# Patient Record
Sex: FEMALE | Race: WHITE | HISPANIC OR LATINO | Employment: UNEMPLOYED | URBAN - METROPOLITAN AREA
[De-identification: names, ages, dates, MRNs, and addresses within clinical notes are randomized per-mention and may not be internally consistent; named-entity substitution may affect disease eponyms.]

---

## 2018-07-29 ENCOUNTER — HOSPITAL ENCOUNTER (EMERGENCY)
Facility: HOSPITAL | Age: 53
Discharge: HOME | End: 2018-07-29
Attending: EMERGENCY MEDICINE

## 2018-07-29 VITALS
HEIGHT: 64 IN | HEART RATE: 76 BPM | DIASTOLIC BLOOD PRESSURE: 70 MMHG | OXYGEN SATURATION: 99 % | TEMPERATURE: 98.5 F | BODY MASS INDEX: 25.61 KG/M2 | WEIGHT: 150 LBS | RESPIRATION RATE: 18 BRPM | SYSTOLIC BLOOD PRESSURE: 136 MMHG

## 2018-07-29 DIAGNOSIS — T16.2XXA FOREIGN BODY OF LEFT EAR, INITIAL ENCOUNTER: Primary | ICD-10-CM

## 2018-07-29 PROCEDURE — 99282 EMERGENCY DEPT VISIT SF MDM: CPT

## 2018-07-29 NOTE — DISCHARGE INSTRUCTIONS
Cuerpo extraño en el oído   LO QUE NECESITA SABER:   Un cuerpo extraño en el oído es un objeto que está atorado en [employer]  Generalmente, los cuerpos extraños se atoran en el conducto del oído externo  [place] es el tubo que va de la apertura del oído al tímpano  INSTRUCCIONES SOBRE [facility]:   Medicamentos:   · Crema esteroide:  [place] medicamento ayuda a disminuir el enrojecimiento y la inflamación  · Antibióticos:  [place] medicamento se administra para ayudar a tratar o prevenir naomi infección causada por bacteria  · [place] shae medicamentos roxana se le haya indicado  Consulte con love médico si usted nino que love medicamento no le está ayudando o si presenta efectos secundarios  Infórmele si es alérgico a algún medicamento  Mantenga naomi lista actualizada de los [place], las vitaminas y los productos herbales que mariah  Incluya los siguientes datos de los medicamentos: cantidad, frecuencia y motivo de administración  Traiga con usted la lista o los envases de la píldoras a shae citas de seguimiento  Lleve la lista de los medicamentos con usted en christian de naomi emergencia  Programe naomi didi con love médico u otorrinolaringólogo roxana se le indique:  Anote shae preguntas para que se acuerde de hacerlas maritza shae visitas  Comuníquese con love médico u otorrinolaringólogo si:   · Usted tiene fiebre  · Tiene dificultad para oir u oye zumbidos  · Usted tiene preguntas o inquietudes acerca de love condición o cuidado  Regrese a la adrian de emergencias si:   · Usted tiene dolor intenso en el oído  · Tiene pus o yulisa drenando del oído  © 2017 [address] Information is for End User's use only and may not be sold, redistributed or otherwise used for commercial purposes  All illustrations and images included in CareNotes® are the copyrighted property of A D A M , Inc  or Anatoly Mar  Esta información es sólo para uso en educación   Love intención no es darle un consejo [employer] Izabel enfermedades o tratamientos  Colsulte con griffin [place] farmacéutico antes de seguir cualquier régimen médico para saber si es seguro y efectivo para usted

## 2018-07-29 NOTE — ED PROVIDER NOTES
History  Chief Complaint   Patient presents with    Ear Problem     Pt states that she was cleaning her ears and the q-tip fell off in her ear 2 weeks ago  Pt is now having pain       History provided by:  Patient  Foreign Body in Ear   Location:  L ear  Suspected object: Q-tip head  Pain quality:  Aching and throbbing  Pain severity:  Mild  Duration:  2 weeks  Timing:  Constant  Progression:  Worsening  Chronicity:  New  Worsened by:  Nothing  Ineffective treatments:  None tried  Associated symptoms: ear pain    Associated symptoms: no abdominal pain, no congestion, no ear discharge, no hearing loss, no nasal discharge, no nausea, no nosebleeds, no sore throat, no trouble swallowing and no vomiting        None       History reviewed  No pertinent past medical history  History reviewed  No pertinent surgical history  History reviewed  No pertinent family history  I have reviewed and agree with the history as documented  Social History   Substance Use Topics    Smoking status: Never Smoker    Smokeless tobacco: Never Used    Alcohol use No        Review of Systems   Constitutional: Negative for activity change, chills, fatigue and fever  HENT: Positive for ear pain  Negative for congestion, ear discharge, hearing loss, mouth sores, nosebleeds, sore throat and trouble swallowing  Eyes: Negative for photophobia and visual disturbance  Cardiovascular: Negative for palpitations  Gastrointestinal: Negative for abdominal pain, constipation, diarrhea, nausea and vomiting  Genitourinary: Negative for decreased urine volume, difficulty urinating, dysuria, genital sores and hematuria  Musculoskeletal: Negative for arthralgias, myalgias, neck pain and neck stiffness  Skin: Negative for rash and wound  Neurological: Negative for dizziness, syncope, weakness, light-headedness, numbness and headaches  Hematological: Negative for adenopathy     All other systems reviewed and are negative  Physical Exam  Physical Exam   Constitutional: She is oriented to person, place, and time  She appears well-developed and well-nourished  No distress  HENT:   Head: Normocephalic and atraumatic  Right Ear: External ear normal    Nose: Nose normal    Mouth/Throat: Oropharynx is clear and moist    Cotton tip of Q-tip noted in left external ear canal  No pain with manipulation of external auricle  No mastoid tenderness of abnormality   Eyes: Conjunctivae and EOM are normal  Pupils are equal, round, and reactive to light  Neck: Normal range of motion  Neck supple  Cardiovascular: Normal rate, regular rhythm, normal heart sounds and intact distal pulses  Pulmonary/Chest: Effort normal and breath sounds normal    Abdominal: Soft  Bowel sounds are normal    Musculoskeletal: Normal range of motion  Neurological: She is alert and oriented to person, place, and time  Skin: Skin is warm and dry  Capillary refill takes less than 2 seconds  No rash noted  She is not diaphoretic  No erythema  No pallor         Vital Signs  ED Triage Vitals [07/29/18 0801]   Temperature Pulse Respirations Blood Pressure SpO2   98 5 °F (36 9 °C) 76 18 136/70 99 %      Temp src Heart Rate Source Patient Position - Orthostatic VS BP Location FiO2 (%)   -- -- -- -- --      Pain Score       --           Vitals:    07/29/18 0801   BP: 136/70   Pulse: 76       Visual Acuity      ED Medications  Medications - No data to display    Diagnostic Studies  Results Reviewed     None                 No orders to display              Procedures  Foreign Body - Orifice  Date/Time: 7/29/2018 8:15 AM  Performed by: Olga Napier  Authorized by: Olga Napier     Patient location:  ED  Other Assisting Provider: No    Consent:     Consent obtained:  Verbal    Consent given by:  Patient    Risks discussed:  Pain and TM perforation  Universal protocol:     Patient identity confirmed:  Verbally with patient  Location:     Location: Ear    Ear location:  L ear  Procedure details:     Localization method:  Direct visualization    Removal mechanism:  Alligator forceps    Foreign bodies recovered:  1    Description:  Wax cover cotton swab tip    Intact foreign body removal: yes    Post-procedure details:     Confirmation:  No additional foreign bodies on visualization    Patient tolerance of procedure: Tolerated well, no immediate complications           Phone Contacts  ED Phone Contact    ED Course                               MDM  Number of Diagnoses or Management Options  Foreign body of left ear, initial encounter: new and requires workup  Diagnosis management comments: Patient is a 62-year-old female presenting for evaluation of foreign body in ear  Patient has had cotton tip from Q-tip in her left external auditory canal for 2 weeks  She states that she has been having sharp pain for last couple days  Worse this morning  No fevers taken at home but patient reports fevers  Patient afebrile here  No discharge from left ear  Q-tip tip removed with alligator forceps  Inflammation noted of the external auditory ear canal however no signs of otitis externa at this time  TMs clear  PCP follow-up if pain persists for antibiotics for otitis externa    Risk of Complications, Morbidity, and/or Mortality  Presenting problems: low  Diagnostic procedures: minimal  Management options: moderate    Patient Progress  Patient progress: improved    CritCare Time    Disposition  Final diagnoses:   Foreign body of left ear, initial encounter     Time reflects when diagnosis was documented in both MDM as applicable and the Disposition within this note     Time User Action Codes Description Comment    7/29/2018  8:31 AM Pavithra Chen  2XXA] Foreign body of left ear, initial encounter       ED Disposition     ED Disposition Condition Comment    Discharge  Murlene Boas discharge to home/self care      Condition at discharge: Stable        Follow-up Information     Follow up With Specialties Details Why Contact Info Additional Information    Camila Rodríguez MD Internal Medicine Schedule an appointment as soon as possible for a visit As needed [address] Emergency Department Emergency Medicine  If symptoms worsen [address]  [phone]  ED, [address] [place] [address], [id number]          There are no discharge medications for this patient  No discharge procedures on file      ED Provider  Electronically Signed by           Dyane Nyhan, PA-C  07/29/18 3588

## 2019-06-26 ENCOUNTER — OFFICE VISIT (OUTPATIENT)
Dept: OBGYN CLINIC | Facility: CLINIC | Age: 54
End: 2019-06-26
Payer: COMMERCIAL

## 2019-06-26 VITALS — SYSTOLIC BLOOD PRESSURE: 138 MMHG | BODY MASS INDEX: 30.18 KG/M2 | WEIGHT: 175.8 LBS | DIASTOLIC BLOOD PRESSURE: 90 MMHG

## 2019-06-26 DIAGNOSIS — Z12.39 BREAST CANCER SCREENING: Primary | ICD-10-CM

## 2019-06-26 PROCEDURE — 87624 HPV HI-RISK TYP POOLED RSLT: CPT | Performed by: OBSTETRICS & GYNECOLOGY

## 2019-06-26 PROCEDURE — G0145 SCR C/V CYTO,THINLAYER,RESCR: HCPCS | Performed by: OBSTETRICS & GYNECOLOGY

## 2019-06-26 PROCEDURE — 99203 OFFICE O/P NEW LOW 30 MIN: CPT | Performed by: OBSTETRICS & GYNECOLOGY

## 2019-06-28 LAB
HPV HR 12 DNA CVX QL NAA+PROBE: NEGATIVE
HPV16 DNA CVX QL NAA+PROBE: NEGATIVE
HPV18 DNA CVX QL NAA+PROBE: NEGATIVE
LAB AP GYN PRIMARY INTERPRETATION: NORMAL
Lab: NORMAL

## 2019-07-01 ENCOUNTER — ULTRASOUND (OUTPATIENT)
Dept: OBGYN CLINIC | Facility: CLINIC | Age: 54
End: 2019-07-01
Payer: COMMERCIAL

## 2019-07-01 DIAGNOSIS — D21.9 FIBROID: Primary | ICD-10-CM

## 2019-07-01 PROCEDURE — 76856 US EXAM PELVIC COMPLETE: CPT | Performed by: OBSTETRICS & GYNECOLOGY

## 2019-07-01 NOTE — PROGRESS NOTES
AMB US Pelvic Non OB  Date/Time: 7/1/2019 11:42 AM  Performed by: Feroz Sullivan  Authorized by: Michelle Mosley MD     Procedure details:     Indications: leiomyoma and intermenstrual blood loss      Technique:  US Pelvic, Non-OB with complete exam  Uterine findings:     Length (cm): 88    Height (cm):  54    Width (cm):  44    Uterine adhesions: not identified      Adnexal mass: not identified      Polyps: not identified      Myomas: identified      Endometrial stripe: identified      Endometrial hyperplasia: not identified      Endometrium thickness (mm):  7  Left ovary findings:     Left ovary:  Visualized    Cysts: not identified      Length (cm): 26    Height (cm): 24    Width (cm): 13  Right ovary findings:     Right ovary:  Visualized    Cysts: not identified      Length (cm): 22    Height (cm): 19    Width (cm): 23  Other findings:     Free pelvic fluid: not identified      Free peritoneal fluid: not identified    Post-Procedure Details:     Impression:  Fibroids=Fundal=18 x 16 x 14 mm,ANT=18 x 18 x 17 mm, Post=22 x 22 x 23 mm    Tolerance:   Tolerated well, no immediate complications

## 2019-07-19 ENCOUNTER — PROCEDURE VISIT (OUTPATIENT)
Dept: OBGYN CLINIC | Facility: CLINIC | Age: 54
End: 2019-07-19
Payer: COMMERCIAL

## 2019-07-19 VITALS
WEIGHT: 176 LBS | SYSTOLIC BLOOD PRESSURE: 136 MMHG | HEIGHT: 65 IN | BODY MASS INDEX: 29.32 KG/M2 | DIASTOLIC BLOOD PRESSURE: 88 MMHG

## 2019-07-19 DIAGNOSIS — D21.9 FIBROIDS: Primary | ICD-10-CM

## 2019-07-19 DIAGNOSIS — N92.4 ABNORMAL PERIMENOPAUSAL BLEEDING: ICD-10-CM

## 2019-07-19 PROCEDURE — 58100 BIOPSY OF UTERUS LINING: CPT | Performed by: OBSTETRICS & GYNECOLOGY

## 2019-07-19 PROCEDURE — 88305 TISSUE EXAM BY PATHOLOGIST: CPT | Performed by: PATHOLOGY

## 2019-07-19 NOTE — PATIENT INSTRUCTIONS
Topic:  Perimenopausal bleeding pattern with uterine fibroids    Endometrial biopsy done without difficulty today and adequate tissue sample obtained    Further therapy planning will be based upon final pathology results    Instructions and restrictions discussed with patient and     Patient will call for any problems issues or concerns that arise for her

## 2019-07-19 NOTE — PROGRESS NOTES
Endometrial biopsy  Date/Time: 7/19/2019 1:22 PM  Performed by: Shamika Gillis MD  Authorized by: Shamika Gillis MD     Consent:     Consent obtained:  Verbal    Consent given by:  Patient    Procedural risks discussed:  Bleeding    Patient questions answered: yes      Patient agrees, verbalizes understanding, and wants to proceed: yes      Educational handouts given: no      Instructions and paperwork completed: yes    Universal protocol:     Patient states understanding of procedure being performed: yes      Relevant documents present and verified: yes      Test results available and properly labeled: yes      Imaging studies available: yes      Required blood products, implants, devices, and special equipment available: yes      Site marked: no    Pre-procedure:     Pre-procedure timeout performed: yes    Procedure:     Procedure: endometrial biopsy with [place]      A bivalve speculum was placed in the vagina: yes      Cervix cleaned and prepped: yes      A paracervical block was performed: no      An intracervical block was performed: no      The cervix was dilated: no      Uterus sounded: yes      Uterus sound depth (cm):  7    Specimen collected: specimen collected and sent to pathology      Patient tolerated procedure well with no complications: yes    Findings:     Uterus size:  6-8 weeks  Comments:      Topic:  Perimenopausal bleeding pattern with uterine fibroids    Endometrial biopsy performed without difficulty with adequate tissue sample obtained    Further treatment planning will be based upon final pathology results    All questions were answered for the patient    Instructions and restrictions reviewed with patient and     If patient will call for any problems issues or concerns that arise for her

## 2019-08-07 ENCOUNTER — TELEPHONE (OUTPATIENT)
Dept: OBGYN CLINIC | Facility: CLINIC | Age: 54
End: 2019-08-07

## 2019-08-07 NOTE — TELEPHONE ENCOUNTER
Pt spouse called looking for bx results  After speaking with Dr Raghu Nuñez pt had a benign polyp  Pt spouse is aware  Scheduled an appt for a yearly appt

## 2019-08-21 ENCOUNTER — TELEPHONE (OUTPATIENT)
Dept: OBGYN CLINIC | Facility: CLINIC | Age: 54
End: 2019-08-21

## 2019-08-21 NOTE — TELEPHONE ENCOUNTER
----- Message from Sheeba Jin MD sent at 8/18/2019 11:56 AM EDT -----  Endometrial biopsy was negative    Patient needs to be scheduled for annual examination    Thanks

## 2019-10-07 ENCOUNTER — OFFICE VISIT (OUTPATIENT)
Dept: FAMILY MEDICINE CLINIC | Facility: CLINIC | Age: 54
End: 2019-10-07
Payer: COMMERCIAL

## 2019-10-07 VITALS
OXYGEN SATURATION: 97 % | BODY MASS INDEX: 29.99 KG/M2 | SYSTOLIC BLOOD PRESSURE: 130 MMHG | WEIGHT: 180 LBS | HEART RATE: 68 BPM | TEMPERATURE: 98.1 F | HEIGHT: 65 IN | DIASTOLIC BLOOD PRESSURE: 80 MMHG | RESPIRATION RATE: 17 BRPM

## 2019-10-07 DIAGNOSIS — Z12.11 SCREENING FOR COLORECTAL CANCER: ICD-10-CM

## 2019-10-07 DIAGNOSIS — Z00.00 ANNUAL PHYSICAL EXAM: Primary | ICD-10-CM

## 2019-10-07 DIAGNOSIS — Z13.0 SCREENING FOR ENDOCRINE, NUTRITIONAL, METABOLIC AND IMMUNITY DISORDER: ICD-10-CM

## 2019-10-07 DIAGNOSIS — Z13.29 SCREENING FOR ENDOCRINE, NUTRITIONAL, METABOLIC AND IMMUNITY DISORDER: ICD-10-CM

## 2019-10-07 DIAGNOSIS — Z28.21 REFUSED INFLUENZA VACCINE: ICD-10-CM

## 2019-10-07 DIAGNOSIS — Z12.12 SCREENING FOR COLORECTAL CANCER: ICD-10-CM

## 2019-10-07 DIAGNOSIS — J34.9 SINUS PROBLEM: ICD-10-CM

## 2019-10-07 DIAGNOSIS — Z13.228 SCREENING FOR ENDOCRINE, NUTRITIONAL, METABOLIC AND IMMUNITY DISORDER: ICD-10-CM

## 2019-10-07 DIAGNOSIS — Z13.21 SCREENING FOR ENDOCRINE, NUTRITIONAL, METABOLIC AND IMMUNITY DISORDER: ICD-10-CM

## 2019-10-07 PROCEDURE — 99386 PREV VISIT NEW AGE 40-64: CPT | Performed by: FAMILY MEDICINE

## 2019-10-07 NOTE — PROGRESS NOTES
[facility]    NAME: Desiree Borrego  AGE: 48 y o  SEX: female  : 1965     DATE: 10/7/2019     Assessment and Plan:     Problem List Items Addressed This Visit     None      Visit Diagnoses     Annual physical exam    -  Primary    Screening for endocrine, nutritional, metabolic and immunity disorder        Relevant Orders    Lipid panel    Comprehensive metabolic panel    CBC and Platelet    TSH, 3rd generation with Free T4 reflex    Vitamin D 25 hydroxy    Screening for colorectal cancer        Relevant Orders    Occult Blood, Fecal Immunochemical (FIT)    BMI 29 0-29 9,adult        Sinus problem        Relevant Orders    Ambulatory Referral to Otolaryngology    Refused influenza vaccine              Immunizations and preventive care screenings were discussed with patient today  Appropriate education was printed on patient's after visit summary  Counseling:  · Exercise: the importance of regular exercise/physical activity was discussed  Recommend exercise 3-5 times per week for at least 30 minutes  BMI Counseling: Body mass index is 29 95 kg/m²  The BMI is above normal  Nutrition recommendations include encouraging healthy choices of fruits and vegetables and decreasing fast food intake  Exercise recommendations include exercising 3-5 times per week  No pharmacotherapy was ordered  Return in 1 year (on 10/7/2020) for Annual physical      Chief Complaint:     No chief complaint on file  History of Present Illness:     Adult Annual Physical   Patient here for a comprehensive physical exam  The patient reports problems - patient report that 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 due to breathing pollution in [place] for several years; this was diagnosed in [facility]     Requests referral to ENT for further evaluation and management      Diet and Physical Activity  · Diet/Nutrition: frequent junk food and limited fruits/vegetables  · Exercise: no formal exercise  Depression Screening  PHQ-9 Depression Screening    PHQ-9:    Frequency of the following problems over the past two weeks:       Little interest or pleasure in doing things:  0 - not at all  Feeling down, depressed, or hopeless:  1 - several days  PHQ-2 Score:  1       General Health  · Sleep: sleeps poorly and gets 4-6 hours of sleep on average  No snoring  · Hearing: normal - bilateral   · Vision: no vision problems  · Dental: no dental visits for >1 year and brushes teeth once daily  /GYN Health   · Patient is: perimenopausal  · Last menstrual period: 9/28/19  · Contraceptive method: not addressed  Review of Systems:     Review of Systems   Constitutional: Negative  HENT:        History of sinus problems diagnosed in [facility]  Eyes: Negative  Respiratory: Negative  Cardiovascular: Negative  Gastrointestinal: Negative  Endocrine: Negative  Genitourinary: Negative  Musculoskeletal: Negative  Skin: Negative  Allergic/Immunologic: Negative  Neurological: Negative  Hematological: Negative  Psychiatric/Behavioral: Negative  Past Medical History:     History reviewed  No pertinent past medical history  Past Surgical History:     History reviewed  No pertinent surgical history     Social History:     Social History     Socioeconomic History    Marital status: /Civil Union     Spouse name: None    Number of children: None    Years of education: None    Highest education level: None   Occupational History    Occupation: unemployed   Social Needs    Financial resource strain: Not hard at all   Jhon-Aneesh insecurity:     Worry: Never true     Inability: Never true   [employer] needs:     Medical: No     Non-medical: No   Tobacco Use    Smoking status: Never Smoker    Smokeless tobacco: Never Used   Substance and Sexual Activity    Alcohol use: No    Drug use: No    Sexual activity: Yes   Lifestyle    Physical activity:     Days per week: 0 days     Minutes per session: 0 min    Stress: Only a little   Relationships    Social connections:     Talks on phone: Patient refused     Gets together: Patient refused     Attends Holiness service: Patient refused     Active member of club or organization: Patient refused     Attends meetings of clubs or organizations: Patient refused     Relationship status: Patient refused    Intimate partner violence:     Fear of current or ex partner: Patient refused     Emotionally abused: Patient refused     Physically abused: Patient refused     Forced sexual activity: Patient refused   Other Topics Concern    None   Social History Narrative    None      Family History:     Family History   Problem Relation Age of Onset    Hypertension Mother       Current Medications:     No current outpatient medications on file  No current facility-administered medications for this visit  Allergies: Allergies   Allergen Reactions    Anesthetic [Benzocaine]       Physical Exam:     /80 (BP Location: Left arm, Patient Position: Sitting, Cuff Size: Large)   Pulse 68   Temp 98 1 °F (36 7 °C) (Oral)   Resp 17   Ht 5' 5" (1 651 m)   Wt 81 6 kg (180 lb)   SpO2 97%   BMI 29 95 kg/m²     Physical Exam   Constitutional: She is oriented to person, place, and time  She appears well-developed and well-nourished  She is cooperative  HENT:   Head: Normocephalic and atraumatic  Right Ear: Hearing, tympanic membrane, external ear and ear canal normal    Left Ear: Hearing, tympanic membrane, external ear and ear canal normal    Mouth/Throat: Uvula is midline, oropharynx is clear and moist and mucous membranes are normal    Eyes: Pupils are equal, round, and reactive to light  Conjunctivae and lids are normal    Neck: Trachea normal and normal range of motion  Neck supple  No thyromegaly present     Cardiovascular: Normal rate, regular rhythm and normal heart sounds  No murmur heard  Pulses:       Posterior tibial pulses are 2+ on the right side, and 2+ on the left side  Pulmonary/Chest: Effort normal and breath sounds normal  She has no decreased breath sounds  She has no wheezes  She has no rhonchi  She has no rales  Abdominal: Soft  Normal appearance and bowel sounds are normal  There is no hepatosplenomegaly  There is no tenderness  Musculoskeletal: Normal range of motion  Right ankle: She exhibits no swelling  Left ankle: She exhibits no swelling  Lymphadenopathy:        Head (right side): No submandibular adenopathy present  Head (left side): No submandibular adenopathy present  She has no cervical adenopathy  Neurological: She is alert and oriented to person, place, and time  No cranial nerve deficit  She exhibits normal muscle tone  Gait normal    Skin: Skin is warm, dry and intact  Psychiatric: She has a normal mood and affect  Her speech is normal and behavior is normal    Nursing note and vitals reviewed        Whitney Trent MD  [address] [place] [address]

## 2019-10-07 NOTE — PATIENT INSTRUCTIONS
Wellness Visit for Adults   AMBULATORY CARE:   A wellness visit  is when you see your healthcare provider to get screened for health problems  You can also get advice on how to stay healthy  Write down your questions so you remember to ask them  Ask your healthcare provider how often you should have a wellness visit  What happens at a wellness visit:  Your healthcare provider will ask about your health, and your family history of health problems  This includes high blood pressure, heart disease, and cancer  He or she will ask if you have symptoms that concern you, if you smoke, and about your mood  You may also be asked about your intake of medicines, supplements, food, and alcohol  Any of the following may be done:  · Your weight  will be checked  Your height may also be checked so your body mass index (BMI) can be calculated  Your BMI shows if you are at a healthy weight  · Your blood pressure  and heart rate will be checked  Your temperature may also be checked  · Blood and urine tests  may be done  Blood tests may be done to check your cholesterol levels  Abnormal cholesterol levels increase your risk for heart disease and stroke  You may also need a blood or urine test to check for diabetes if you are at increased risk  Urine tests may be done to look for signs of an infection or kidney disease  · A physical exam  includes checking your heartbeat and lungs with a stethoscope  Your healthcare provider may also check your skin to look for sun damage  · Screening tests  may be recommended  A screening test is done to check for diseases that may not cause symptoms  The screening tests you may need depend on your age, gender, family history, and lifestyle habits  For example, colorectal screening may be recommended if you are 48years old or older  Screening tests you need if you are a woman:   · A Pap smear  is used to screen for cervical cancer   Pap smears are usually done every 3 to 5 years depending on your age  You may need them more often if you have had abnormal Pap smear test results in the past  Ask your healthcare provider how often you should have a Pap smear  · A mammogram  is an x-ray of your breasts to screen for breast cancer  Experts recommend mammograms every 2 years starting at age 48 years  You may need a mammogram at age 52 years or younger if you have an increased risk for breast cancer  Talk to your healthcare provider about when you should start having mammograms and how often you need them  Vaccines you may need:   · Get an influenza vaccine  every year  The influenza vaccine protects you from the flu  Several types of viruses cause the flu  The viruses change over time, so new vaccines are made each year  · Get a tetanus-diphtheria (Td) booster vaccine  every 10 years  This vaccine protects you against tetanus and diphtheria  Tetanus is a severe infection that may cause painful muscle spasms and lockjaw  Diphtheria is a severe bacterial infection that causes a thick covering in the back of your mouth and throat  · Get a human papillomavirus (HPV) vaccine  if you are female and aged 23 to 32 or male 23 to 24 and never received it  This vaccine protects you from HPV infection  HPV is the most common infection spread by sexual contact  HPV may also cause vaginal, penile, and anal cancers  · Get a pneumococcal vaccine  if you are aged 72 years or older  The pneumococcal vaccine is an injection given to protect you from pneumococcal disease  Pneumococcal disease is an infection caused by pneumococcal bacteria  The infection may cause pneumonia, meningitis, or an ear infection  · Get a shingles vaccine  if you are aged 61 or older, even if you have had shingles before  The shingles vaccine is an injection to protect you from the varicella-zoster virus  This is the same virus that causes chickenpox   Shingles is a painful rash that develops in people who had chickenpox or have been exposed to the virus  How to eat healthy:  My Plate is a model for planning healthy meals  It shows the types and amounts of foods that should go on your plate  Fruits and vegetables make up about half of your plate, and grains and protein make up the other half  A serving of dairy is included on the side of your plate  The amount of calories and serving sizes you need depends on your age, gender, weight, and height  Examples of healthy foods are listed below:  · Eat a variety of vegetables  such as dark green, red, and orange vegetables  You can also include canned vegetables low in sodium (salt) and frozen vegetables without added butter or sauces  · Eat a variety of fresh fruits , canned fruit in 100% juice, frozen fruit, and dried fruit  · Include whole grains  At least half of the grains you eat should be whole grains  Examples include whole-wheat bread, wheat pasta, brown rice, and whole-grain cereals such as oatmeal     · Eat a variety of protein foods such as seafood (fish and shellfish), lean meat, and poultry without skin (turkey and chicken)  Examples of lean meats include pork leg, shoulder, or tenderloin, and beef round, sirloin, tenderloin, and extra lean ground beef  Other protein foods include eggs and egg substitutes, beans, peas, soy products, nuts, and seeds  · Choose low-fat dairy products such as skim or 1% milk or low-fat yogurt, cheese, and cottage cheese  · Limit unhealthy fats  such as butter, hard margarine, and shortening  Exercise:  Exercise at least 30 minutes per day on most days of the week  Some examples of exercise include walking, biking, dancing, and swimming  You can also fit in more physical activity by taking the stairs instead of the elevator or parking farther away from stores  Include muscle strengthening activities 2 days each week  Regular exercise provides many health benefits   It helps you manage your weight, and decreases your risk for type 2 diabetes, heart disease, stroke, and high blood pressure  Exercise can also help improve your mood  Ask your healthcare provider about the best exercise plan for you  General health and safety guidelines:   · Do not smoke  Nicotine and other chemicals in cigarettes and cigars can cause lung damage  Ask your healthcare provider for information if you currently smoke and need help to quit  E-cigarettes or smokeless tobacco still contain nicotine  Talk to your healthcare provider before you use these products  · Limit alcohol  A drink of alcohol is 12 ounces of beer, 5 ounces of wine, or 1½ ounces of liquor  · Lose weight, if needed  Being overweight increases your risk of certain health conditions  These include heart disease, high blood pressure, type 2 diabetes, and certain types of cancer  · Protect your skin  Do not sunbathe or use tanning beds  Use sunscreen with a SPF 15 or higher  Apply sunscreen at least 15 minutes before you go outside  Reapply sunscreen every 2 hours  Wear protective clothing, hats, and sunglasses when you are outside  · Drive safely  Always wear your seatbelt  Make sure everyone in your car wears a seatbelt  A seatbelt can save your life if you are in an accident  Do not use your cell phone when you are driving  This could distract you and cause an accident  Pull over if you need to make a call or send a text message  · Practice safe sex  Use latex condoms if are sexually active and have more than one partner  Your healthcare provider may recommend screening tests for sexually transmitted infections (STIs)  · Wear helmets, lifejackets, and protective gear  Always wear a helmet when you ride a bike or motorcycle, go skiing, or play sports that could cause a head injury  Wear protective equipment when you play sports  Wear a lifejacket when you are on a boat or doing water sports    © 2017 [employer] Information is for End User's use only and may not be sold, redistributed or otherwise used for commercial purposes  All illustrations and images included in CareNotes® are the copyrighted property of A D A M , Inc  or Anatoly Mar  The above information is an  only  It is not intended as medical advice for individual conditions or treatments  Talk to your doctor, nurse or pharmacist before following any medical regimen to see if it is safe and effective for you  Weight Management   AMBULATORY CARE:   Why it is important to manage your weight:  Being overweight increases your risk of health conditions such as heart disease, high blood pressure, type 2 diabetes, and certain types of cancer  It can also increase your risk for osteoarthritis, sleep apnea, and other respiratory problems  Aim for a slow, steady weight loss  Even a small amount of weight loss can lower your risk of health problems  How to lose weight safely:  A safe and healthy way to lose weight is to eat fewer calories and get regular exercise  You can lose up about 1 pound a week by decreasing the number of calories you eat by 500 calories each day  You can decrease calories by eating smaller portion sizes or by cutting out high-calorie foods  Read labels to find out how many calories are in the foods you eat  You can also burn calories with exercise such as walking, swimming, or biking  You will be more likely to keep weight off if you make these changes part of your lifestyle  Healthy meal plan for weight management:  A healthy meal plan includes a variety of foods, contains fewer calories, and helps you stay healthy  A healthy meal plan includes the following:  · Eat whole-grain foods more often  A healthy meal plan should contain fiber  Fiber is the part of grains, fruits, and vegetables that is not broken down by your body  Whole-grain foods are healthy and provide extra fiber in your diet   Some examples of whole-grain foods are whole-wheat breads and pastas, oatmeal, brown rice, and bulgur  · Eat a variety of vegetables every day  Include dark, leafy greens such as spinach, kale, martina greens, and mustard greens  Eat yellow and orange vegetables such as carrots, sweet potatoes, and winter squash  · Eat a variety of fruits every day  Choose fresh or canned fruit (canned in its own juice or light syrup) instead of juice  Fruit juice has very little or no fiber  · Eat low-fat dairy foods  Drink fat-free (skim) milk or 1% milk  Eat fat-free yogurt and low-fat cottage cheese  Try low-fat cheeses such as mozzarella and other reduced-fat cheeses  · Choose meat and other protein foods that are low in fat  Choose beans or other legumes such as split peas or lentils  Choose fish, skinless poultry (chicken or turkey), or lean cuts of red meat (beef or pork)  Before you cook meat or poultry, cut off any visible fat  · Use less fat and oil  Try baking foods instead of frying them  Add less fat, such as margarine, sour cream, regular salad dressing and mayonnaise to foods  Eat fewer high-fat foods  Some examples of high-fat foods include french fries, doughnuts, ice cream, and cakes  · Eat fewer sweets  Limit foods and drinks that are high in sugar  This includes candy, cookies, regular soda, and sweetened drinks  Ways to decrease calories:   · Eat smaller portions  ¨ Use a small plate with smaller servings  ¨ Do not eat second helpings  ¨ When you eat at a restaurant, ask for a box and place half of your meal in the box before you eat  ¨ Share an entrée with someone else  · Replace high-calorie snacks with healthy, low-calorie snacks  ¨ Choose fresh fruit, vegetables, fat-free rice cakes, or air-popped popcorn instead of potato chips, nuts, or chocolate  ¨ Choose water or calorie-free drinks instead of soda or sweetened drinks  · Eat regular meals  Skipping meals can lead to overeating later in the day   Eat a healthy snack in place of a meal if you do not have time to eat a regular meal      · Do not shop for groceries when you are hungry  You may be more likely to make unhealthy food choices  Take a grocery list of healthy foods and shop after you have eaten  Exercise:  Exercise at least 30 minutes per day on most days of the week  Some examples of exercise include walking, biking, dancing, and swimming  You can also fit in more physical activity by taking the stairs instead of the elevator or parking farther away from stores  Ask your healthcare provider about the best exercise plan for you  Other things to consider as you try to lose weight:   · Be aware of situations that may give you the urge to overeat, such as eating while watching television  Find ways to avoid these situations  For example, read a book, go for a walk, or do crafts  · Meet with a weight loss support group or friends who are also trying to lose weight  This may help you stay motivated to continue working on your weight loss goals  © 2017 [employer] Information is for End User's use only and may not be sold, redistributed or otherwise used for commercial purposes  All illustrations and images included in CareNotes® are the copyrighted property of [employer]  or Anatoly Mar  The above information is an  only  It is not intended as medical advice for individual conditions or treatments  Talk to your doctor, nurse or pharmacist before following any medical regimen to see if it is safe and effective for you

## 2019-11-21 ENCOUNTER — APPOINTMENT (OUTPATIENT)
Dept: LAB | Facility: HOSPITAL | Age: 54
End: 2019-11-21
Payer: COMMERCIAL

## 2019-11-21 DIAGNOSIS — Z13.29 SCREENING FOR ENDOCRINE, NUTRITIONAL, METABOLIC AND IMMUNITY DISORDER: ICD-10-CM

## 2019-11-21 DIAGNOSIS — Z13.0 SCREENING FOR ENDOCRINE, NUTRITIONAL, METABOLIC AND IMMUNITY DISORDER: ICD-10-CM

## 2019-11-21 DIAGNOSIS — Z13.21 SCREENING FOR ENDOCRINE, NUTRITIONAL, METABOLIC AND IMMUNITY DISORDER: ICD-10-CM

## 2019-11-21 DIAGNOSIS — Z13.228 SCREENING FOR ENDOCRINE, NUTRITIONAL, METABOLIC AND IMMUNITY DISORDER: ICD-10-CM

## 2019-11-21 LAB
25(OH)D3 SERPL-MCNC: 14.4 NG/ML (ref 30–100)
ALBUMIN SERPL BCP-MCNC: 3.7 G/DL (ref 3.5–5)
ALP SERPL-CCNC: 98 U/L (ref 46–116)
ALT SERPL W P-5'-P-CCNC: 19 U/L (ref 12–78)
ANION GAP SERPL CALCULATED.3IONS-SCNC: 6 MMOL/L (ref 4–13)
AST SERPL W P-5'-P-CCNC: 11 U/L (ref 5–45)
BILIRUB SERPL-MCNC: 0.66 MG/DL (ref 0.2–1)
BUN SERPL-MCNC: 10 MG/DL (ref 5–25)
CALCIUM SERPL-MCNC: 8.8 MG/DL (ref 8.3–10.1)
CHLORIDE SERPL-SCNC: 109 MMOL/L (ref 100–108)
CHOLEST SERPL-MCNC: 235 MG/DL (ref 50–200)
CO2 SERPL-SCNC: 27 MMOL/L (ref 21–32)
CREAT SERPL-MCNC: 0.77 MG/DL (ref 0.6–1.3)
ERYTHROCYTE [DISTWIDTH] IN BLOOD BY AUTOMATED COUNT: 13.4 % (ref 11.6–15.1)
GFR SERPL CREATININE-BSD FRML MDRD: 89 ML/MIN/1.73SQ M
GLUCOSE P FAST SERPL-MCNC: 93 MG/DL (ref 65–99)
HCT VFR BLD AUTO: 40.3 % (ref 34.8–46.1)
HDLC SERPL-MCNC: 55 MG/DL
HGB BLD-MCNC: 13.2 G/DL (ref 11.5–15.4)
LDLC SERPL CALC-MCNC: 157 MG/DL (ref 0–100)
MCH RBC QN AUTO: 27.8 PG (ref 26.8–34.3)
MCHC RBC AUTO-ENTMCNC: 32.8 G/DL (ref 31.4–37.4)
MCV RBC AUTO: 85 FL (ref 82–98)
NONHDLC SERPL-MCNC: 180 MG/DL
PLATELET # BLD AUTO: 259 THOUSANDS/UL (ref 149–390)
PMV BLD AUTO: 10.6 FL (ref 8.9–12.7)
POTASSIUM SERPL-SCNC: 3.4 MMOL/L (ref 3.5–5.3)
PROT SERPL-MCNC: 7.6 G/DL (ref 6.4–8.2)
RBC # BLD AUTO: 4.74 MILLION/UL (ref 3.81–5.12)
SODIUM SERPL-SCNC: 142 MMOL/L (ref 136–145)
TRIGL SERPL-MCNC: 114 MG/DL
TSH SERPL DL<=0.05 MIU/L-ACNC: 1.89 UIU/ML (ref 0.36–3.74)
WBC # BLD AUTO: 8.16 THOUSAND/UL (ref 4.31–10.16)

## 2019-11-21 PROCEDURE — 85027 COMPLETE CBC AUTOMATED: CPT

## 2019-11-21 PROCEDURE — 80053 COMPREHEN METABOLIC PANEL: CPT

## 2019-11-21 PROCEDURE — 84443 ASSAY THYROID STIM HORMONE: CPT

## 2019-11-21 PROCEDURE — 82306 VITAMIN D 25 HYDROXY: CPT

## 2019-11-21 PROCEDURE — 36415 COLL VENOUS BLD VENIPUNCTURE: CPT

## 2019-11-21 PROCEDURE — 80061 LIPID PANEL: CPT

## 2019-11-22 ENCOUNTER — TELEPHONE (OUTPATIENT)
Dept: FAMILY MEDICINE CLINIC | Facility: CLINIC | Age: 54
End: 2019-11-22

## 2019-11-22 NOTE — TELEPHONE ENCOUNTER
Recommend patient schedule a follow-up visit to discuss her lab results  Patient may access [employer] to see the results any time she wishes to do so  Please also remind patient that she needs to complete the FIT kit for colon cancer screening

## 2019-12-16 ENCOUNTER — OFFICE VISIT (OUTPATIENT)
Dept: FAMILY MEDICINE CLINIC | Facility: CLINIC | Age: 54
End: 2019-12-16
Payer: COMMERCIAL

## 2019-12-16 VITALS
WEIGHT: 182 LBS | DIASTOLIC BLOOD PRESSURE: 78 MMHG | HEART RATE: 78 BPM | BODY MASS INDEX: 30.32 KG/M2 | TEMPERATURE: 97.8 F | SYSTOLIC BLOOD PRESSURE: 128 MMHG | RESPIRATION RATE: 16 BRPM | HEIGHT: 65 IN | OXYGEN SATURATION: 98 %

## 2019-12-16 DIAGNOSIS — E87.6 HYPOKALEMIA: ICD-10-CM

## 2019-12-16 DIAGNOSIS — E78.5 HYPERLIPIDEMIA LDL GOAL <100: ICD-10-CM

## 2019-12-16 DIAGNOSIS — E55.9 VITAMIN D DEFICIENCY: Primary | ICD-10-CM

## 2019-12-16 PROCEDURE — 99214 OFFICE O/P EST MOD 30 MIN: CPT | Performed by: FAMILY MEDICINE

## 2019-12-16 PROCEDURE — 1036F TOBACCO NON-USER: CPT | Performed by: FAMILY MEDICINE

## 2019-12-16 PROCEDURE — 3008F BODY MASS INDEX DOCD: CPT | Performed by: FAMILY MEDICINE

## 2019-12-16 RX ORDER — ERGOCALCIFEROL 1.25 MG/1
50000 CAPSULE ORAL WEEKLY
Qty: 12 CAPSULE | Refills: 0 | Status: SHIPPED | OUTPATIENT
Start: 2019-12-16 | End: 2020-11-20

## 2019-12-16 RX ORDER — ROSUVASTATIN CALCIUM 10 MG/1
10 TABLET, COATED ORAL DAILY
Qty: 90 TABLET | Refills: 1 | Status: SHIPPED | OUTPATIENT
Start: 2019-12-16

## 2019-12-16 NOTE — ASSESSMENT & PLAN NOTE
Patient has a slightly low potassium of 3 4  Recommend increasing the potassium in her diet, and a handout was provided  Will reassess her potassium level with next labs in 3 months

## 2019-12-16 NOTE — PATIENT INSTRUCTIONS
Lista de alimentos con contenido de potasio   LO QUE NECESITA SABER:   ¿Qué es el potasio? El potasio es un mineral que se encuentra en la mayoría de los alimentos  El potasio ayuda a balancear los líquidos y minerales en griffin cuerpo  También ayuda al cuerpo a mantener la presión sanguínea a un nivel normal  El potasio facilita las contracciones musculares y la función [place] de los nervios  Es posible que deba aumentar o disminuir el consumo de potasio si sufre ciertas afecciones médicas  ¿Por qué necesito cambiar la cantidad de potasio que consumo? · Es posible que deba consumir más potasio  si usted tiene hipocalemia (niveles bajos de potasio) o presión arterial fady  También podría necesitar más potasio en griffin dieta si usted mariah diuréticos  Los diuréticos y ciertos medicamentos causan que griffin cuerpo pierda potasio  · Es posible que deba consumir menos potasio  en griffin dieta si usted tiene hipercalemia (niveles altos de potasio) o naomi enfermedad renal   ¿Cuánto potasio contienen las frutas? La cantidad de potasio en miligramos (mg) en cada fruta o porción de fruta, aparece en la lista junto a cada elemento    · Alimentos con alto contenido de potasio (más de 200 mg por porción):      ¨ 1 plátano mediano (425)    ¨ ½ papaya (390)    ¨ ½ taza de jugo de ciruela pasa (370)    ¨ ¼ de naomi taza de pasas (270)    ¨ 1 tasneem mediano (325) o kiwi (240)    ¨ 1 naranja pequeña (240) o ½ taza de jugo de naranja (235)    ¨ ½ taza de melón kam (215) o melón roma kam (200)    ¨ 1 nehemias mediana (200)    · Alimentos con un contenido medio de potasio (50 a 200 mg por porción):      ¨ 1 durazno mediano (185)    ¨ 1 manzana pequeña o ½ taza de jugo de [place] (150)    ¨ ½ taza de duraznos enlatados en jugo (120)    ¨ ½ taza de padilla enlatada (100)    ¨ ½ taza de fresas frescas y rebanadas (125)    ¨ ½ taza de sandía (85)    · Alimentos con bajo contenido de potasio (menos de 50 mg por porción):      ¨ ½ taza de arándanos (45) o cóctel de jugo de arándano (20)    ¨ ½ taza de néctar de papaya, tasneem o nehemias (35)  ¿Cuanta de potasio Ankit? · Alimentos con alto contenido de potasio (más de 200 mg por porción):      ¨ 1 papa mediana horneada con cascara (925)    ¨ 1 camote mediano horneado con cascara (450)    ¨ ½ taza de tomate o jugo de vegetales (275), o 1 tomate crudo de tamaño mediano (290)    ¨ ½ taza de champiñones (280)    ¨ ½ taza de coles de Bruselas frescas (250)    ¨ ½ taza de calabacín cocida (220) o calabaza de invierno (250)    ¨ ¼ de un aguacate mediano (245)    ¨ ½ taza de brócoli (230)    · Alimentos con un contenido medio de potasio (50 a 200 mg por porción):      ¨ ½ taza de maíz (195)    ¨ ½ taza de zanahorias frescas o cocidas (180)    ¨ ½ taza de coliflor fresco (150)    ¨ ½ taza de espárragos (155)    ¨ ½ taza de guisantes enlatados (90)     ¨ 1 taza de ruth, de todo tipo (100)    ¨ ½ taza de habas verdes frescas (90)    ¨ ½ taza de habas verdes congeladas (85)    ¨ ½ taza de pepino (80)  ¿Qué cantidad de potasio contienen los alimentos proteicos? · Alimentos con alto contenido de potasio (más de 200 mg por porción):      ¨ ½ taza de frijoles pintos cocidos (400) o lentejas (365)    ¨ 1 taza de leche de soya (300)    ¨ 3 onzas de salmón horneado o asado (319)    ¨ 3 onzas de pavo asado, carne oscura (250)    ¨ ¼ de naomi taza de semillas de girasol (241)    ¨ 3 onzas de carne magra de res cocida (224)    ¨ 2 cucharadas de mantequilla de maní suave (210)    · Alimentos con un contenido medio de potasio (50 a 200 mg por porción):      ¨ 1 onza de maní, almendras o anacardos salados (200)    ¨ 1 huevo hussain (60)  ¿Qué cantidad de potasio contienen los alimentos lácteos?    · Alimentos con alto contenido de potasio (más de 200 mg por porción):      ¨ 6 onzas de yogur (260 a 435)    ¨ 1 taza de leche entera o descremada y baja en grasas (350 a 200)    · Alimentos con un contenido medio de [employer] (50 a 200 mg por porción):      ¨ ½ taza de queso ricotta (154)    ¨ ½ taza de helado de vainilla (131)    ¨ ½ taza de requesón (2%) bajo en grasa (110)    · Alimentos con bajo contenido de potasio (menos de 50 mg por porción):      ¨ 1 onza de queso (20 a 30)    ¿Qué cantidad de potasio contienen los granos? · 1 rebanada de pan gibbs (30)    · ½ taza de arroz gibbs o integral (50)    · ½ taza de espagueti o macarrones (30)    · 1 tortilla de harina o maíz (50)    · 1 wafle de cuatro pulgadas (50)  ¿Cuáles otros alimentos contienen potasio? · 1 cucharada de melazas (295)    · 1½ onzas de chocolate (165)    · Algunos sustitutos de la sal pueden contener naomi fady cantidad de potasio  Revise la etiqueta de los alimentos para encontrar la cantidad de potasio que contienen  ACUERDOS SOBRE FELDER CUIDADO:   Usted tiene el derecho de ayudar a planear felder cuidado  Discuta shae opciones de tratamiento con shae médicos para decidir el cuidado que usted desea recibir  Usted siempre tiene el derecho de rechazar el tratamiento  Esta información es sólo para uso en educación  Felder intención no es darle un consejo médico sobre enfermedades o tratamientos  Colsulte con felder Armando Hu farmacéutico antes de seguir cualquier régimen médico para saber si es seguro y efectivo para usted  © 2017 [address] [employer] Information is for End User's use only and may not be sold, redistributed or otherwise used for commercial purposes  All illustrations and images included in CareNotes® are the copyrighted property of A D A M , Inc  or Anatoly Cedillo con alto contenido de [employer]   LO QUE NECESITA SABER:   ¿Qué es naomi dieta con alto contenido de Twisp? Naomi dieta con alto contenido de fibra incluye alimentos con naomi fady cantidad de fibra  La fibra es la parte de las frutas, los vegetales y los granos, que no se descompone al ser ingerida por felder cuerpo  La fibra ayuda a regular las evacuaciones intestinales  La fibra además ayuda a bajar el colesterol, controlar la glucosa en la yulisa en las personas que sufren de diabetes y para aliviar el estreñimiento  También la fibra podría ayudarle a controlar griffin peso debido a que le da la sensación de llenura más rápidamente  La mayoría de los adultos deberían consumir entre 25 a 35 gramos de fibra al día  Consulte con griffin dietista o médico sobre la cantidad de fibra que usted necesita  ¿Cuáles alimentos son Maryann Board? · Alimentos que contienen al menos 4 gramos de fibra por porción:      ¨ ? a ½ de naomi taza de cereal con alto contenido de fibra (jack la etiqueta nutricional en la caja)    ¨ ½ taza de moras o frambuesas    ¨ 4 ciruelas pasas    ¨ 1 alcachofa cocida    ¨ ½ taza de legumbres cocidas, roxana lentejas o frijoles rojos o pintos    · [employer] contienen 1 a 3 gramos de Donna por porción:      ¨ 1 rebanada de pan de krystle integral, pan integral de thurston o pan de thurston    ¨ ½ taza de arroz integral cocido    ¨ 4 galletas integrales    ¨ 1 taza de ina    ¨ ½ taza de cereal con 1 a 3 gramos de [place] por porción (jack la etiqueta nutricional en la caja)    ¨ 1 porción pequeña de fruta roxana manzana, banana, nehemias, kiwi o naranja    ¨ 3 dátiles    ¨ ½ taza de albaricoques enlatados, ensalada de frutas, duraznos o peras    ¨ ½ taza de verduras crudas o cocidas, roxana zanahorias, coliflor, repollo, espinaca, calabaza o maíz  ¿Cuáles son las formas que puedo aumentar la Donna en mi dieta? · Escoja arroz integral o gordo en vez de arroz gibbs      · Use harina de grano integral en las recetas en vez de harina nic  · Dinora Amor y arvejas a los guisos o las sopas  · Robert New y verduras frescas con la cascara en vez de jugos  ¿Qué otras pautas isamar seguir? · Phoebe Major a griffin dieta lentamente  Usted podría presentar malestar o inflamación estomacal y gases si añade fibra a griffin dieta demasiado rápido       · Landon García líquido a medida que agrega fibra a felder Mary Ly  Es posible que tenga náuseas o desarrolle estreñimiento si no mariah suficiente agua  Pregunte cuánto líquido debe cesario cada día y cuáles líquidos son los más adecuados para usted  ACUERDOS SOBRE FELDER CUIDADO:   Usted tiene el derecho de ayudar a planear felder cuidado  Discuta shae opciones de tratamiento con shae médicos para decidir el cuidado que usted desea recibir  Usted siempre tiene el derecho de rechazar el tratamiento  Esta información es sólo para uso en educación  Felder intención no es darle un consejo médico sobre enfermedades o tratamientos  Colsulte con felder Modi Severance farmacéutico antes de seguir cualquier régimen médico para saber si es seguro y efectivo para usted  © 2017 [facility] [facility] Information is for End User's use only and may not be sold, redistributed or otherwise used for commercial purposes  All illustrations and images included in CareNotes® are the copyrighted property of [employer]  or Anatoly Mar

## 2019-12-16 NOTE — ASSESSMENT & PLAN NOTE
Patient is found to be deficient in vitamin D with a level of 14 4  Will treat with ergocalciferol 50,000 IU weekly for 12 weeks  Will reassess with labs and follow-up in 3 months

## 2019-12-16 NOTE — ASSESSMENT & PLAN NOTE
Patient found to have an elevated LDL at 157  Encouraged her to work on diet modification and a regular exercise program   Will begin treatment with rosuvastatin 10 mg daily  Plan to reassess with labs and follow-up in 3 months

## 2019-12-16 NOTE — PROGRESS NOTES
Assessment/Plan:    Vitamin D deficiency  Patient is found to be deficient in vitamin D with a level of 14 4  Will treat with ergocalciferol 50,000 IU weekly for 12 weeks  Will reassess with labs and follow-up in 3 months  Hypokalemia  Patient has a slightly low potassium of 3 4  Recommend increasing the potassium in her diet, and a handout was provided  Will reassess her potassium level with next labs in 3 months  Hyperlipidemia LDL goal <100  Patient found to have an elevated LDL at 157  Encouraged her to work on diet modification and a regular exercise program   Will begin treatment with rosuvastatin 10 mg daily  Plan to reassess with labs and follow-up in 3 months  Diagnoses and all orders for this visit:    Vitamin D deficiency  -     ergocalciferol (VITAMIN D2) 50,000 units; Take 1 capsule (50,000 Units total) by mouth once a week  -     Vitamin D 25 hydroxy; Future    Hyperlipidemia LDL goal <100  -     rosuvastatin (CRESTOR) 10 MG tablet; Take 1 tablet (10 mg total) by mouth daily  -     Lipid Panel with Direct LDL reflex; Future    Hypokalemia  -     Comprehensive metabolic panel; Future          Subjective:      Patient ID: Tegan Quiroga is a 48 y o  female  Patient presents to clinic with her  for a follow-up visit  She completed labs that were ordered at the last visit, and is here to review results  All results were reviewed, and all questions were answered  Patient as no new complaints today  The following portions of the patient's history were reviewed and updated as appropriate: allergies, current medications, past family history, past medical history, past social history, past surgical history and problem list     Review of Systems   Respiratory: Negative for shortness of breath  Cardiovascular: Negative for chest pain           Objective:      /78 (BP Location: Left arm, Patient Position: Sitting, Cuff Size: Standard)   Pulse 78   Temp 97 8 °F (36 6 °C) (Tympanic)   Resp 16   Ht 5' 5" (1 651 m)   Wt 82 6 kg (182 lb)   SpO2 98%   BMI 30 29 kg/m²          Physical Exam   Constitutional: She is oriented to person, place, and time  She appears well-developed and well-nourished  She is cooperative  No distress  HENT:   Head: Normocephalic and atraumatic  Right Ear: Hearing and external ear normal    Left Ear: Hearing and external ear normal    Eyes: Conjunctivae and lids are normal    Cardiovascular: Normal rate  Pulmonary/Chest: Effort normal  No respiratory distress  Musculoskeletal: Normal range of motion  Neurological: She is alert and oriented to person, place, and time  She exhibits normal muscle tone  Gait normal    Skin: Skin is warm, dry and intact  Psychiatric: She has a normal mood and affect  Her speech is normal and behavior is normal    Nursing note and vitals reviewed  BMI Counseling: Body mass index is 30 29 kg/m²  The BMI is above normal  Nutrition recommendations include encouraging healthy choices of fruits and vegetables and limiting drinks that contain sugar  Exercise recommendations include exercising 3-5 times per week            Lab Results   Component Value Date    TDZ5SYDARNUP 1 890 11/21/2019     Lab Results   Component Value Date    WBC 8 16 11/21/2019    HGB 13 2 11/21/2019    HCT 40 3 11/21/2019    MCV 85 11/21/2019     11/21/2019     Lab Results   Component Value Date    SODIUM 142 11/21/2019    K 3 4 (L) 11/21/2019     (H) 11/21/2019    CO2 27 11/21/2019    AGAP 6 11/21/2019    BUN 10 11/21/2019    CREATININE 0 77 11/21/2019    GLUF 93 11/21/2019    CALCIUM 8 8 11/21/2019    AST 11 11/21/2019    ALT 19 11/21/2019    ALKPHOS 98 11/21/2019    TP 7 6 11/21/2019    TBILI 0 66 11/21/2019    EGFR 89 11/21/2019     Lab Results   Component Value Date    CHOLESTEROL 235 (H) 11/21/2019     Lab Results   Component Value Date    HDL 55 11/21/2019     Lab Results   Component Value Date    TRIG 114 11/21/2019 Lab Results   Component Value Date    [place] 180 11/21/2019     Lab Results   Component Value Date    LDLCALC 157 (H) 11/21/2019

## 2020-11-20 ENCOUNTER — OFFICE VISIT (OUTPATIENT)
Dept: FAMILY MEDICINE CLINIC | Facility: CLINIC | Age: 55
End: 2020-11-20
Payer: COMMERCIAL

## 2020-11-20 VITALS
OXYGEN SATURATION: 98 % | HEIGHT: 65 IN | RESPIRATION RATE: 17 BRPM | BODY MASS INDEX: 30.32 KG/M2 | HEART RATE: 76 BPM | WEIGHT: 182 LBS | DIASTOLIC BLOOD PRESSURE: 80 MMHG | TEMPERATURE: 97.7 F | SYSTOLIC BLOOD PRESSURE: 140 MMHG

## 2020-11-20 DIAGNOSIS — Z12.12 ENCOUNTER FOR SCREENING FOR COLORECTAL MALIGNANT NEOPLASM: ICD-10-CM

## 2020-11-20 DIAGNOSIS — E78.5 HYPERLIPIDEMIA LDL GOAL <100: ICD-10-CM

## 2020-11-20 DIAGNOSIS — E66.09 CLASS 1 OBESITY DUE TO EXCESS CALORIES WITHOUT SERIOUS COMORBIDITY WITH BODY MASS INDEX (BMI) OF 30.0 TO 30.9 IN ADULT: ICD-10-CM

## 2020-11-20 DIAGNOSIS — E55.9 VITAMIN D DEFICIENCY: ICD-10-CM

## 2020-11-20 DIAGNOSIS — Z12.11 ENCOUNTER FOR SCREENING FOR COLORECTAL MALIGNANT NEOPLASM: ICD-10-CM

## 2020-11-20 DIAGNOSIS — E87.6 HYPOKALEMIA: Primary | ICD-10-CM

## 2020-11-20 PROCEDURE — 3008F BODY MASS INDEX DOCD: CPT | Performed by: NURSE PRACTITIONER

## 2020-11-20 PROCEDURE — 3725F SCREEN DEPRESSION PERFORMED: CPT | Performed by: NURSE PRACTITIONER

## 2020-11-20 PROCEDURE — 1036F TOBACCO NON-USER: CPT | Performed by: NURSE PRACTITIONER

## 2020-11-20 PROCEDURE — 99214 OFFICE O/P EST MOD 30 MIN: CPT | Performed by: NURSE PRACTITIONER

## 2021-04-10 ENCOUNTER — IMMUNIZATIONS (OUTPATIENT)
Dept: FAMILY MEDICINE CLINIC | Facility: HOSPITAL | Age: 56
End: 2021-04-10

## 2021-04-10 DIAGNOSIS — Z23 ENCOUNTER FOR IMMUNIZATION: Primary | ICD-10-CM

## 2021-04-10 PROCEDURE — 91300 SARS-COV-2 / COVID-19 MRNA VACCINE (PFIZER-BIONTECH) 30 MCG: CPT

## 2021-04-10 PROCEDURE — 0001A SARS-COV-2 / COVID-19 MRNA VACCINE (PFIZER-BIONTECH) 30 MCG: CPT

## 2021-05-01 ENCOUNTER — IMMUNIZATIONS (OUTPATIENT)
Dept: FAMILY MEDICINE CLINIC | Facility: HOSPITAL | Age: 56
End: 2021-05-01

## 2021-05-01 DIAGNOSIS — Z23 ENCOUNTER FOR IMMUNIZATION: Primary | ICD-10-CM

## 2021-05-01 PROCEDURE — 0002A SARS-COV-2 / COVID-19 MRNA VACCINE (PFIZER-BIONTECH) 30 MCG: CPT

## 2021-05-01 PROCEDURE — 91300 SARS-COV-2 / COVID-19 MRNA VACCINE (PFIZER-BIONTECH) 30 MCG: CPT

## 2021-08-10 ENCOUNTER — TELEPHONE (OUTPATIENT)
Dept: FAMILY MEDICINE CLINIC | Facility: CLINIC | Age: 56
End: 2021-08-10